# Patient Record
Sex: MALE | Race: BLACK OR AFRICAN AMERICAN | NOT HISPANIC OR LATINO | URBAN - METROPOLITAN AREA
[De-identification: names, ages, dates, MRNs, and addresses within clinical notes are randomized per-mention and may not be internally consistent; named-entity substitution may affect disease eponyms.]

---

## 2022-01-17 ENCOUNTER — INPATIENT (INPATIENT)
Facility: HOSPITAL | Age: 52
LOS: 2 days | Discharge: HOME | DRG: 871 | End: 2022-01-20
Attending: STUDENT IN AN ORGANIZED HEALTH CARE EDUCATION/TRAINING PROGRAM
Payer: COMMERCIAL

## 2022-01-17 VITALS
RESPIRATION RATE: 20 BRPM | HEIGHT: 68 IN | HEART RATE: 124 BPM | TEMPERATURE: 99 F | WEIGHT: 160.06 LBS | DIASTOLIC BLOOD PRESSURE: 66 MMHG | OXYGEN SATURATION: 100 % | SYSTOLIC BLOOD PRESSURE: 95 MMHG

## 2022-01-17 DIAGNOSIS — H26.9 UNSPECIFIED CATARACT: Chronic | ICD-10-CM

## 2022-01-17 DIAGNOSIS — R00.0 TACHYCARDIA, UNSPECIFIED: ICD-10-CM

## 2022-01-17 DIAGNOSIS — R07.9 CHEST PAIN, UNSPECIFIED: ICD-10-CM

## 2022-01-17 DIAGNOSIS — C80.1 MALIGNANT (PRIMARY) NEOPLASM, UNSPECIFIED: ICD-10-CM

## 2022-01-17 DIAGNOSIS — J96.01 ACUTE RESPIRATORY FAILURE WITH HYPOXIA: ICD-10-CM

## 2022-01-17 DIAGNOSIS — M79.602 PAIN IN LEFT ARM: ICD-10-CM

## 2022-01-17 DIAGNOSIS — E78.5 HYPERLIPIDEMIA, UNSPECIFIED: ICD-10-CM

## 2022-01-17 DIAGNOSIS — D64.9 ANEMIA, UNSPECIFIED: ICD-10-CM

## 2022-01-17 DIAGNOSIS — Z29.9 ENCOUNTER FOR PROPHYLACTIC MEASURES, UNSPECIFIED: ICD-10-CM

## 2022-01-17 LAB
ALBUMIN SERPL ELPH-MCNC: 2.6 G/DL — LOW (ref 3.4–5)
ALP SERPL-CCNC: 65 U/L — SIGNIFICANT CHANGE UP (ref 40–120)
ALT FLD-CCNC: 22 U/L — SIGNIFICANT CHANGE UP (ref 12–42)
ANION GAP SERPL CALC-SCNC: 7 MMOL/L — LOW (ref 9–16)
APPEARANCE UR: CLEAR — SIGNIFICANT CHANGE UP
APTT BLD: 36.4 SEC — HIGH (ref 27.5–35.5)
AST SERPL-CCNC: 20 U/L — SIGNIFICANT CHANGE UP (ref 15–37)
BASOPHILS # BLD AUTO: 0.06 K/UL — SIGNIFICANT CHANGE UP (ref 0–0.2)
BASOPHILS NFR BLD AUTO: 0.4 % — SIGNIFICANT CHANGE UP (ref 0–2)
BILIRUB SERPL-MCNC: 0.2 MG/DL — SIGNIFICANT CHANGE UP (ref 0.2–1.2)
BILIRUB UR-MCNC: NEGATIVE — SIGNIFICANT CHANGE UP
BUN SERPL-MCNC: 17 MG/DL — SIGNIFICANT CHANGE UP (ref 7–23)
CALCIUM SERPL-MCNC: 8.6 MG/DL — SIGNIFICANT CHANGE UP (ref 8.5–10.5)
CHLORIDE SERPL-SCNC: 104 MMOL/L — SIGNIFICANT CHANGE UP (ref 96–108)
CO2 SERPL-SCNC: 31 MMOL/L — SIGNIFICANT CHANGE UP (ref 22–31)
COLOR SPEC: YELLOW — SIGNIFICANT CHANGE UP
CREAT SERPL-MCNC: 0.79 MG/DL — SIGNIFICANT CHANGE UP (ref 0.5–1.3)
CRP SERPL-MCNC: 19 MG/L — HIGH (ref 0–9)
D DIMER BLD IA.RAPID-MCNC: 392 NG/ML DDU — HIGH
DIFF PNL FLD: NEGATIVE — SIGNIFICANT CHANGE UP
EOSINOPHIL # BLD AUTO: 0.45 K/UL — SIGNIFICANT CHANGE UP (ref 0–0.5)
EOSINOPHIL NFR BLD AUTO: 2.9 % — SIGNIFICANT CHANGE UP (ref 0–6)
GLUCOSE BLDC GLUCOMTR-MCNC: 116 MG/DL — HIGH (ref 70–99)
GLUCOSE SERPL-MCNC: 96 MG/DL — SIGNIFICANT CHANGE UP (ref 70–99)
GLUCOSE UR QL: NEGATIVE — SIGNIFICANT CHANGE UP
HCT VFR BLD CALC: 26.9 % — LOW (ref 39–50)
HGB BLD-MCNC: 8.6 G/DL — LOW (ref 13–17)
HIV 1 & 2 AB SERPL IA.RAPID: SIGNIFICANT CHANGE UP
IMM GRANULOCYTES NFR BLD AUTO: 0.5 % — SIGNIFICANT CHANGE UP (ref 0–1.5)
INR BLD: 1.25 — HIGH (ref 0.88–1.16)
KETONES UR-MCNC: NEGATIVE — SIGNIFICANT CHANGE UP
LACTATE SERPL-SCNC: 0.5 MMOL/L — SIGNIFICANT CHANGE UP (ref 0.4–2)
LACTATE SERPL-SCNC: 1.2 MMOL/L — SIGNIFICANT CHANGE UP (ref 0.4–2)
LEUKOCYTE ESTERASE UR-ACNC: NEGATIVE — SIGNIFICANT CHANGE UP
LYMPHOCYTES # BLD AUTO: 1.31 K/UL — SIGNIFICANT CHANGE UP (ref 1–3.3)
LYMPHOCYTES # BLD AUTO: 8.3 % — LOW (ref 13–44)
MAGNESIUM SERPL-MCNC: 2.4 MG/DL — SIGNIFICANT CHANGE UP (ref 1.6–2.6)
MCHC RBC-ENTMCNC: 29.7 PG — SIGNIFICANT CHANGE UP (ref 27–34)
MCHC RBC-ENTMCNC: 32 GM/DL — SIGNIFICANT CHANGE UP (ref 32–36)
MCV RBC AUTO: 92.8 FL — SIGNIFICANT CHANGE UP (ref 80–100)
MONOCYTES # BLD AUTO: 1.1 K/UL — HIGH (ref 0–0.9)
MONOCYTES NFR BLD AUTO: 7 % — SIGNIFICANT CHANGE UP (ref 2–14)
MRSA PCR RESULT.: NEGATIVE — SIGNIFICANT CHANGE UP
NEUTROPHILS # BLD AUTO: 12.69 K/UL — HIGH (ref 1.8–7.4)
NEUTROPHILS NFR BLD AUTO: 80.9 % — HIGH (ref 43–77)
NITRITE UR-MCNC: NEGATIVE — SIGNIFICANT CHANGE UP
NRBC # BLD: 0 /100 WBCS — SIGNIFICANT CHANGE UP (ref 0–0)
NT-PROBNP SERPL-SCNC: 513 PG/ML — HIGH
PCO2 BLDV: 36 MMHG — LOW (ref 42–55)
PH BLDV: 7.42 — SIGNIFICANT CHANGE UP (ref 7.32–7.43)
PH UR: 6 — SIGNIFICANT CHANGE UP (ref 5–8)
PLATELET # BLD AUTO: 742 K/UL — HIGH (ref 150–400)
PO2 BLDV: 57 MMHG — HIGH (ref 25–45)
POTASSIUM SERPL-MCNC: 4.1 MMOL/L — SIGNIFICANT CHANGE UP (ref 3.5–5.3)
POTASSIUM SERPL-SCNC: 4.1 MMOL/L — SIGNIFICANT CHANGE UP (ref 3.5–5.3)
PROT SERPL-MCNC: 6.7 G/DL — SIGNIFICANT CHANGE UP (ref 6.4–8.2)
PROT UR-MCNC: NEGATIVE MG/DL — SIGNIFICANT CHANGE UP
PROTHROM AB SERPL-ACNC: 14.6 SEC — HIGH (ref 10.6–13.6)
RBC # BLD: 2.9 M/UL — LOW (ref 4.2–5.8)
RBC # FLD: 13 % — SIGNIFICANT CHANGE UP (ref 10.3–14.5)
S AUREUS DNA NOSE QL NAA+PROBE: NEGATIVE — SIGNIFICANT CHANGE UP
SAO2 % BLDV: 89.8 % — HIGH (ref 67–88)
SARS-COV-2 RNA SPEC QL NAA+PROBE: DETECTED
SODIUM SERPL-SCNC: 142 MMOL/L — SIGNIFICANT CHANGE UP (ref 132–145)
SP GR SPEC: <=1.005 — SIGNIFICANT CHANGE UP (ref 1–1.03)
TROPONIN I, HIGH SENSITIVITY RESULT: 5.8 NG/L — SIGNIFICANT CHANGE UP
TROPONIN I, HIGH SENSITIVITY RESULT: 7.5 NG/L — SIGNIFICANT CHANGE UP
TSH SERPL-MCNC: 1.16 UIU/ML — SIGNIFICANT CHANGE UP (ref 0.36–3.74)
UROBILINOGEN FLD QL: 0.2 E.U./DL — SIGNIFICANT CHANGE UP
WBC # BLD: 15.69 K/UL — HIGH (ref 3.8–10.5)
WBC # FLD AUTO: 15.69 K/UL — HIGH (ref 3.8–10.5)

## 2022-01-17 PROCEDURE — 99223 1ST HOSP IP/OBS HIGH 75: CPT | Mod: GC

## 2022-01-17 PROCEDURE — 71045 X-RAY EXAM CHEST 1 VIEW: CPT | Mod: 26

## 2022-01-17 PROCEDURE — 71275 CT ANGIOGRAPHY CHEST: CPT | Mod: 26

## 2022-01-17 PROCEDURE — 93010 ELECTROCARDIOGRAM REPORT: CPT

## 2022-01-17 PROCEDURE — 99223 1ST HOSP IP/OBS HIGH 75: CPT

## 2022-01-17 PROCEDURE — 99291 CRITICAL CARE FIRST HOUR: CPT | Mod: 25

## 2022-01-17 RX ORDER — ENOXAPARIN SODIUM 100 MG/ML
40 INJECTION SUBCUTANEOUS EVERY 24 HOURS
Refills: 0 | Status: DISCONTINUED | OUTPATIENT
Start: 2022-01-17 | End: 2022-01-20

## 2022-01-17 RX ORDER — GABAPENTIN 400 MG/1
300 CAPSULE ORAL DAILY
Refills: 0 | Status: DISCONTINUED | OUTPATIENT
Start: 2022-01-17 | End: 2022-01-17

## 2022-01-17 RX ORDER — REMDESIVIR 5 MG/ML
200 INJECTION INTRAVENOUS EVERY 24 HOURS
Refills: 0 | Status: COMPLETED | OUTPATIENT
Start: 2022-01-17 | End: 2022-01-17

## 2022-01-17 RX ORDER — PIPERACILLIN AND TAZOBACTAM 4; .5 G/20ML; G/20ML
3.38 INJECTION, POWDER, LYOPHILIZED, FOR SOLUTION INTRAVENOUS ONCE
Refills: 0 | Status: COMPLETED | OUTPATIENT
Start: 2022-01-17 | End: 2022-01-17

## 2022-01-17 RX ORDER — REMDESIVIR 5 MG/ML
100 INJECTION INTRAVENOUS EVERY 24 HOURS
Refills: 0 | Status: DISCONTINUED | OUTPATIENT
Start: 2022-01-18 | End: 2022-01-20

## 2022-01-17 RX ORDER — GABAPENTIN 400 MG/1
400 CAPSULE ORAL EVERY 8 HOURS
Refills: 0 | Status: DISCONTINUED | OUTPATIENT
Start: 2022-01-17 | End: 2022-01-19

## 2022-01-17 RX ORDER — IPRATROPIUM/ALBUTEROL SULFATE 18-103MCG
3 AEROSOL WITH ADAPTER (GRAM) INHALATION
Refills: 0 | Status: COMPLETED | OUTPATIENT
Start: 2022-01-17 | End: 2022-01-17

## 2022-01-17 RX ORDER — DEXAMETHASONE 0.5 MG/5ML
6 ELIXIR ORAL EVERY 24 HOURS
Refills: 0 | Status: DISCONTINUED | OUTPATIENT
Start: 2022-01-18 | End: 2022-01-20

## 2022-01-17 RX ORDER — REMDESIVIR 5 MG/ML
INJECTION INTRAVENOUS
Refills: 0 | Status: DISCONTINUED | OUTPATIENT
Start: 2022-01-17 | End: 2022-01-20

## 2022-01-17 RX ORDER — AZITHROMYCIN 500 MG/1
500 TABLET, FILM COATED ORAL ONCE
Refills: 0 | Status: DISCONTINUED | OUTPATIENT
Start: 2022-01-17 | End: 2022-01-17

## 2022-01-17 RX ORDER — PIPERACILLIN AND TAZOBACTAM 4; .5 G/20ML; G/20ML
4.5 INJECTION, POWDER, LYOPHILIZED, FOR SOLUTION INTRAVENOUS EVERY 6 HOURS
Refills: 0 | Status: DISCONTINUED | OUTPATIENT
Start: 2022-01-17 | End: 2022-01-20

## 2022-01-17 RX ORDER — SODIUM CHLORIDE 9 MG/ML
2300 INJECTION INTRAMUSCULAR; INTRAVENOUS; SUBCUTANEOUS ONCE
Refills: 0 | Status: COMPLETED | OUTPATIENT
Start: 2022-01-17 | End: 2022-01-17

## 2022-01-17 RX ORDER — ACETAMINOPHEN 500 MG
975 TABLET ORAL ONCE
Refills: 0 | Status: COMPLETED | OUTPATIENT
Start: 2022-01-17 | End: 2022-01-17

## 2022-01-17 RX ORDER — PANTOPRAZOLE SODIUM 20 MG/1
40 TABLET, DELAYED RELEASE ORAL
Refills: 0 | Status: DISCONTINUED | OUTPATIENT
Start: 2022-01-17 | End: 2022-01-20

## 2022-01-17 RX ORDER — SODIUM CHLORIDE 9 MG/ML
500 INJECTION INTRAMUSCULAR; INTRAVENOUS; SUBCUTANEOUS ONCE
Refills: 0 | Status: COMPLETED | OUTPATIENT
Start: 2022-01-17 | End: 2022-01-17

## 2022-01-17 RX ORDER — CEFTRIAXONE 500 MG/1
1000 INJECTION, POWDER, FOR SOLUTION INTRAMUSCULAR; INTRAVENOUS ONCE
Refills: 0 | Status: COMPLETED | OUTPATIENT
Start: 2022-01-17 | End: 2022-01-17

## 2022-01-17 RX ORDER — MORPHINE SULFATE 50 MG/1
2 CAPSULE, EXTENDED RELEASE ORAL EVERY 4 HOURS
Refills: 0 | Status: DISCONTINUED | OUTPATIENT
Start: 2022-01-17 | End: 2022-01-19

## 2022-01-17 RX ORDER — IBUPROFEN 200 MG
400 TABLET ORAL ONCE
Refills: 0 | Status: COMPLETED | OUTPATIENT
Start: 2022-01-17 | End: 2022-01-17

## 2022-01-17 RX ORDER — SODIUM CHLORIDE 9 MG/ML
1000 INJECTION INTRAMUSCULAR; INTRAVENOUS; SUBCUTANEOUS ONCE
Refills: 0 | Status: COMPLETED | OUTPATIENT
Start: 2022-01-17 | End: 2022-01-17

## 2022-01-17 RX ORDER — PIPERACILLIN AND TAZOBACTAM 4; .5 G/20ML; G/20ML
3.38 INJECTION, POWDER, LYOPHILIZED, FOR SOLUTION INTRAVENOUS EVERY 8 HOURS
Refills: 0 | Status: DISCONTINUED | OUTPATIENT
Start: 2022-01-17 | End: 2022-01-17

## 2022-01-17 RX ORDER — ATORVASTATIN CALCIUM 80 MG/1
20 TABLET, FILM COATED ORAL AT BEDTIME
Refills: 0 | Status: DISCONTINUED | OUTPATIENT
Start: 2022-01-17 | End: 2022-01-20

## 2022-01-17 RX ORDER — DEXAMETHASONE 0.5 MG/5ML
10 ELIXIR ORAL ONCE
Refills: 0 | Status: COMPLETED | OUTPATIENT
Start: 2022-01-17 | End: 2022-01-17

## 2022-01-17 RX ADMIN — Medication 3 MILLILITER(S): at 02:15

## 2022-01-17 RX ADMIN — SODIUM CHLORIDE 2300 MILLILITER(S): 9 INJECTION INTRAMUSCULAR; INTRAVENOUS; SUBCUTANEOUS at 01:07

## 2022-01-17 RX ADMIN — Medication 400 MILLIGRAM(S): at 23:30

## 2022-01-17 RX ADMIN — SODIUM CHLORIDE 2300 MILLILITER(S): 9 INJECTION INTRAMUSCULAR; INTRAVENOUS; SUBCUTANEOUS at 03:46

## 2022-01-17 RX ADMIN — PIPERACILLIN AND TAZOBACTAM 200 GRAM(S): 4; .5 INJECTION, POWDER, LYOPHILIZED, FOR SOLUTION INTRAVENOUS at 17:46

## 2022-01-17 RX ADMIN — Medication 3 MILLILITER(S): at 02:30

## 2022-01-17 RX ADMIN — Medication 10 MILLIGRAM(S): at 01:07

## 2022-01-17 RX ADMIN — Medication 3 MILLILITER(S): at 02:45

## 2022-01-17 RX ADMIN — SODIUM CHLORIDE 1000 MILLILITER(S): 9 INJECTION INTRAMUSCULAR; INTRAVENOUS; SUBCUTANEOUS at 04:46

## 2022-01-17 RX ADMIN — PIPERACILLIN AND TAZOBACTAM 200 GRAM(S): 4; .5 INJECTION, POWDER, LYOPHILIZED, FOR SOLUTION INTRAVENOUS at 05:53

## 2022-01-17 RX ADMIN — CEFTRIAXONE 100 MILLIGRAM(S): 500 INJECTION, POWDER, FOR SOLUTION INTRAMUSCULAR; INTRAVENOUS at 01:07

## 2022-01-17 RX ADMIN — Medication 400 MILLIGRAM(S): at 22:18

## 2022-01-17 RX ADMIN — REMDESIVIR 500 MILLIGRAM(S): 5 INJECTION INTRAVENOUS at 17:46

## 2022-01-17 RX ADMIN — GABAPENTIN 300 MILLIGRAM(S): 400 CAPSULE ORAL at 19:16

## 2022-01-17 RX ADMIN — ENOXAPARIN SODIUM 40 MILLIGRAM(S): 100 INJECTION SUBCUTANEOUS at 22:18

## 2022-01-17 RX ADMIN — SODIUM CHLORIDE 1000 MILLILITER(S): 9 INJECTION INTRAMUSCULAR; INTRAVENOUS; SUBCUTANEOUS at 23:11

## 2022-01-17 RX ADMIN — Medication 975 MILLIGRAM(S): at 01:10

## 2022-01-17 RX ADMIN — ATORVASTATIN CALCIUM 20 MILLIGRAM(S): 80 TABLET, FILM COATED ORAL at 23:11

## 2022-01-17 NOTE — H&P ADULT - NSHPPHYSICALEXAM_GEN_ALL_CORE
Constitutional: Laying comfortably and breathing via 3 L NC  HEENT: NC/AT, PERRL, non icteric sclera  Neck: supple, no JVD  Respiratory: CTA b/l  Cardiovascular: S1 S2 heard, no murmur  Gastrointestinal: soft, NT/ND, BSx4  Genitourinary: no CVA or supra pubic tenderness   Vascular: pulses palpable b/l  Neurological: AAO x3, weak left hand    Musculoskeletal: left hand thenar and hypothenar wasting, proximal and distal interphalangeal joints flexed, thumb ROM preserved, extremely tender left arm

## 2022-01-17 NOTE — PROGRESS NOTE ADULT - PROBLEM SELECTOR PLAN 6
Pt was diagnosed with lung malignancy/ metastasis but according to him, no work up was done, no treatment was given   CT chest PE: Innumerable bilateral solid pulmonary lung nodules consistent with metastatic disease.    To do:  - try to get collaterals from [facility]  - consider heme onc consult tomorrow Pt was diagnosed with lung malignancy/ metastasis but according to him, no work up was done, no treatment was given   CT chest PE: Innumerable bilateral solid pulmonary lung nodules consistent with metastatic disease.    To do:  - try to get collaterals from [facility]  - consider heme onc consult tomorrow  -Nutrition consult (order in)

## 2022-01-17 NOTE — H&P ADULT - PROBLEM SELECTOR PLAN 5
F: none   E: replete prn   N: DASH/TLC  A: Lovenox 40 mg daily   GI: Pantoprazole 40mg  D: Telemetry---> RMF

## 2022-01-17 NOTE — CHART NOTE - NSCHARTNOTEFT_GEN_A_CORE
Pt refused labs upon arriving to [facility]. Given that patient presented with L sided chest pain with EKG findings of Qwaves in V1-V2 (unknown baseline), attempted to repeat lab work including troponin and CKMB. Explained to patient the importance of lab work given his symptoms, to r/o an acute MI, however he states that we all "get a kick out of taking labs" and that is all we do. Pt refusing labs at this time after knowing and understanding risks and benefits. Will continue to encourage lab draws, EKGs.

## 2022-01-17 NOTE — PROGRESS NOTE ADULT - PROBLEM SELECTOR PLAN 2
Pt c/o chest pain radiating to neck and left arm and Left hands   - On adm , RR 20 sat 100% on RA--> 95% on 3L, Trop I 7.5-->5.8  - EKG: sinus tachycardia  - chest and right arm is tender to touch   - CT chest PE: Innumerable bilateral solid pulmonary lung nodules consistent with metastatic disease.  

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
## 2022-01-17 NOTE — PROGRESS NOTE ADULT - PROBLEM SELECTOR PLAN 7
F: none   E: replete prn   N: DASH/TLC  A: Lovenox 40 mg daily   GI: Pantoprazole 40mg  D: [facility] Home med atorvastatin 20mg  -Continue

## 2022-01-17 NOTE — PROGRESS NOTE ADULT - PROBLEM SELECTOR PLAN 4
On adm Hb 8.6, MCV 92.8 likely AOCD    To do:   - Monitor H/H   - active type and screen  - transfuse prn

## 2022-01-17 NOTE — H&P ADULT - PROBLEM SELECTOR PLAN 4
F: none   E: replete prn   N: DASH/TLC  A: Lovenox 40 mg daily   GI: Pantoprazole 40mg  D: Telemetry---> RMF Pt was diagnosed with lung malignancy/ metastasis but according to him, no work up was done, no treatment was given   CT chest PE: Innumerable bilateral solid pulmonary lung nodules consistent with metastatic disease.    To do:  - try to get collaterals from [facility]  - consider heme onc consult

## 2022-01-17 NOTE — PROGRESS NOTE ADULT - PROBLEM SELECTOR PLAN 3
Patient persisstently tachycardic with sinus rhythm on EKG. Patient reports SOB requiring 6 L NC and complaining of L arm and hand pain., most likely causing the tachycardia.   - CT PE negative fo PE  - continue to monitor

## 2022-01-17 NOTE — H&P ADULT - ATTENDING COMMENTS
Patient seen and examined with house-staff during bedside rounds.  Resident note read, including vitals, physical findings, laboratory data, and radiological reports.   Revisions included below.  Direct personal management at bed side and extensive interpretation of the data.  Plan was outlined and discussed in details with the housestaff.  Decision making of high complexity  Action taken for acute disease activity to reflect the level of care provided:  - medication reconciliation  - review laboratory data  Patient was admitted with left chest pain.  The CT scan of the chest was negative for thromboembolic disease.  Patient has multiple nodular lesions consistent metastatic disease primary as per patient lung but also could be kidney or renal.  The right lower lobe infiltrate I doubt is related to his underlying malignancy.  This is a could be related to COVID-pneumonia and or superimposed bacterial pneumonia as the patient has been recently in the hospital and also lives in the shelter.  The saturation is stable on 2 L nasal cannula.  To start remdesivir Decadron and Zosyn.  MRSA was negative.  Patient is stable to transfer to the floor

## 2022-01-17 NOTE — H&P ADULT - PROBLEM SELECTOR PLAN 2
Pt c/o chest pain radiating to neck and left arm   - On adm , RR 20 sat 100% on RA--> 95% on 3L, Trop I 7.5-->5.8  - EKG: sinus tachycardia  - chest and right arm is tender to touch   - CT chest PE: Innumerable bilateral solid pulmonary lung nodules consistent with metastatic disease.  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

## 2022-01-17 NOTE — PROGRESS NOTE ADULT - PROBLEM SELECTOR PLAN 1
On admission , RR 20 sat 100% on RA--> 95% on 3L. EKG: sinus tachycardia. CT PE: no pulmonary embolism, Patchy ground-glass and consolidative opacities bilaterally consistent with bilateral interstitial pneumonia secondary to patient's Covid 19 diagnosis but may also consider post-obstructive pna. MRSA negative   - f/u procalcitonin level, COVID labs: patient refusing labs, will continue to encourage morning labs   - c/w Remdesevir x5 and Decadron x10 total doses for COVID pna  - c/w Zosyn for HAP (given he was at [facility] for malignancy work up) which can be discontinued after procal result is available On admission , RR 20 sat 100% on RA--> 95% on 3L. EKG: sinus tachycardia. CT PE: no pulmonary embolism, Patchy ground-glass and consolidative opacities bilaterally consistent with bilateral interstitial pneumonia secondary to patient's Covid 19 diagnosis but may also consider post-obstructive pna or CAP. MRSA negative   - f/u procalcitonin level, COVID labs: patient refusing labs, will continue to encourage morning labs   - c/w Remdesivir x5 and Decadron x10 total doses for COVID pna  - c/w Zosyn for HAP (given he was at [facility] for malignancy work up) which can be discontinued after procal result is available  -On 6l. Wean as tolerated

## 2022-01-17 NOTE — PROGRESS NOTE ADULT - PROBLEM SELECTOR PLAN 5
On adm Hb 8.6, MCV 92.8 likely AOCD    To do:   - Monitor H/H   - active type and screen  - transfuse prn On adm Hb 8.6, MCV 92.8 likely AOCD  -F/u iron studies, B12, Folate  - Monitor H/H   - active type and screen  - transfuse prn

## 2022-01-17 NOTE — PROGRESS NOTE ADULT - PROBLEM SELECTOR PLAN 2
Pt c/o chest pain radiating to neck and left arm and Left hands   - On adm , RR 20 sat 100% on RA--> 95% on 3L, Trop I 7.5-->5.8  - EKG: sinus tachycardia  - chest and right arm is tender to touch   - CT chest PE: Innumerable bilateral solid pulmonary lung nodules consistent with metastatic disease.  

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
## 2022-01-17 NOTE — ED ADULT TRIAGE NOTE - PRO INTERPRETER NEED 2
The patient's mother is calling to ask if the doctor recommends the patient to have the Hep A and Manjacocal  vaccines.   English

## 2022-01-17 NOTE — ED PROVIDER NOTE - CRITICAL CARE ATTENDING CONTRIBUTION TO CARE
The patient was seen immediately upon arrival due to a high probability of imminent or life-threatening deterioration secondary to COVID, metastatic cancer, sepsis, which required my direct attention, intervention, and personal management at the bedside. I have personally provided critical care time exclusive of time spent on separately billable procedures. Time includes review of laboratory data, radiology results, discussion with consultants, discussion with the patient's family, and monitoring for potential decompensation.

## 2022-01-17 NOTE — PROGRESS NOTE ADULT - PROBLEM SELECTOR PLAN 4
Patient persisstently tachycardic with sinus rhythm on EKG. Patient reports SOB requiring 6 L NC and complaining of L arm and hand pain., most likely causing the tachycardia.   - CT PE negative fo PE  - continue to monitor Patient persistently tachycardic with sinus rhythm on EKG. Patient reports SOB requiring 6 L NC and complaining of L arm and hand pain. Home HTN meds include metoprolol succ 50mg and Entresto 24/26 BID  -Hold home meds i/s/o normotension  - CT PE negative fo PE  - continue to monitor    #HTN  Home HTN meds include metoprolol succ 50mg and Entresto 24/26 BID. Cr wnl  -Restart home Entresto Patient persistently tachycardic with sinus rhythm on EKG. Patient reports SOB requiring 6 L NC and complaining of L arm and hand pain. Home HTN meds include metoprolol succ 50mg and Entresto 24/26 BID  -Hold home meds i/s/o normotension  - CT PE negative fo PE  - continue to monitor  - f/u TSH, T4  -500CC bolus of NS    #HTN  Home HTN meds include metoprolol succ 50mg and Entresto 24/26 BID. Cr wnl  -Hold home meds i/s/o low BPs

## 2022-01-17 NOTE — PROGRESS NOTE ADULT - PROBLEM SELECTOR PLAN 8
F: none   E: replete prn   N: DASH/TLC  A: Lovenox 40 mg daily   GI: Pantoprazole 40mg  D: RMF F: none   E: replete prn   N: DASH/TLC with iss (on steriod)  A: Lovenox 40 mg daily   GI: Pantoprazole 40mg  D: RMF

## 2022-01-17 NOTE — H&P ADULT - PROBLEM SELECTOR PLAN 3
Pt was diagnosed with lung malignancy/ metastasis but according to him, no work up was done, no treatment was given   CT chest PE: Innumerable bilateral solid pulmonary lung nodules consistent with metastatic disease.    To do:  - try to get collaterals from [facility]  - consider heme onc consult On adm Hb 8.6, MCV 92.8 likely AOCD    To do:   - active type and screen  - transfuse prn

## 2022-01-17 NOTE — H&P ADULT - PROBLEM SELECTOR PLAN 1
- On adm , RR 20 sat 100% on RA--> 95% on 3L  - EKG: sinus tachycardia  -  CT PE: no pulmonary embolism, Patchy ground-glass and consolidative opacities bilaterally consistent with bilateral interstitial pneumonia secondary to patient's Covid 19 diagnosis.  - MRSA negative     To do:   - f/u procalcitonin level, COVID labs   - cw Remdesevir x5 and Decadron x10 total doses for COVID pna  - cw Zosyn for HAP (given he was at [facility] for malignancy work up) which can be discontinued after procal result is available

## 2022-01-17 NOTE — PROGRESS NOTE ADULT - PROBLEM SELECTOR PLAN 1
- On adm , RR 20 sat 100% on RA--> 95% on 3L  - EKG: sinus tachycardia  -  CT PE: no pulmonary embolism, Patchy ground-glass and consolidative opacities bilaterally consistent with bilateral interstitial pneumonia secondary to patient's Covid 19 diagnosis.  - MRSA negative     To do:   - f/u procalcitonin level, COVID labs: patient refusing labs, will continue to encourage morning labs   - c/w Remdesevir x5 and Decadron x10 total doses for COVID pna  - c/w Zosyn for HAP (given he was at [facility] for malignancy work up) which can be discontinued after procal result is available

## 2022-01-17 NOTE — ED PROVIDER NOTE - PROGRESS NOTE DETAILS
Lungs better after nebs, Sats 100% on RA, RR 25-28, HR still in 120's despited 3.3L NS. BP soft at 94/59. Case discussed with accepted for SDU by Dr. Gallegos. Patient is agreeable. Appears to have liver lesions as well as b/l lung nodules.
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
## 2022-01-17 NOTE — PROGRESS NOTE ADULT - PROBLEM SELECTOR PLAN 5
Pt was diagnosed with lung malignancy/ metastasis but according to him, no work up was done, no treatment was given   CT chest PE: Innumerable bilateral solid pulmonary lung nodules consistent with metastatic disease.    To do:  - try to get collaterals from [facility]  - consider heme onc consult tomorrow
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
## 2022-01-17 NOTE — PROGRESS NOTE ADULT - PROBLEM SELECTOR PLAN 3
Takes gabapentin 400mg TID however states this does not alleviate his pain. Pain is electrical and burning in nature with limited ROM of hand, and limited strength.  -Neurology consult in AM  - Increase gabapentin 300 TID to home dose 400 TID  - f/u PT and OT recs Takes gabapentin 400mg TID however states this does not alleviate his pain. Pain is electrical and burning in nature with limited ROM of hand, and limited strength. Nerve compression related to malignancy  -Neurology consult in AM  - Increase gabapentin 300 TID to home dose 400 TID  - f/u PT and OT recs  -ESR CRP

## 2022-01-17 NOTE — H&P ADULT - NSHPLABSRESULTS_GEN_ALL_CORE
.  LABS:                         8.6    15.69 )-----------( 742      ( 17 Jan 2022 01:24 )             26.9     01-17    142  |  104  |  17  ----------------------------<  96  4.1   |  31  |  0.79    Ca    8.6      17 Jan 2022 01:24  Mg     2.4     01-17    TPro  6.7  /  Alb  2.6<L>  /  TBili  0.2  /  DBili  x   /  AST  20  /  ALT  22  /  AlkPhos  65  01-17    PT/INR - ( 17 Jan 2022 01:24 )   PT: 14.6 sec;   INR: 1.25          PTT - ( 17 Jan 2022 01:24 )  PTT:36.4 sec  Urinalysis Basic - ( 17 Jan 2022 04:37 )    Color: Yellow / Appearance: Clear / SG: <=1.005 / pH: x  Gluc: x / Ketone: NEGATIVE  / Bili: NEGATIVE / Urobili: 0.2 E.U./dL   Blood: x / Protein: NEGATIVE mg/dL / Nitrite: NEGATIVE   Leuk Esterase: NEGATIVE / RBC: x / WBC x   Sq Epi: x / Non Sq Epi: x / Bacteria: x            Lactate, Blood: 0.5 mmoL/L (01-17 @ 05:45)      RADIOLOGY, EKG & ADDITIONAL TESTS: Reviewed.
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
## 2022-01-17 NOTE — ED PROVIDER NOTE - CLINICAL SUMMARY MEDICAL DECISION MAKING FREE TEXT BOX
Patient presenting with  and HARIKA, + hx of COVID and recent dx pf lung CA (? primary or metastatic). patient is a very poor historian. Sepsis celestin initiated as he felt warm. Trial of nebs/decadron. Will need admission.

## 2022-01-17 NOTE — ED PROVIDER NOTE - PHYSICAL EXAMINATION
VITAL SIGNS: I have reviewed nursing notes and confirm.  CONSTITUTIONAL: Frail, thin, tachypneic, no resp distress, fair hygiene, pleasant and cooperative  SKIN: Skin is warm and dry, no acute rash.  HEAD: Normocephalic; atraumatic.  EYES: Pupils round bilaterally, 3mm; conjunctiva and sclera clear.  ENT: No nasal discharge; airway clear, MMM.  NECK: Supple; non tender.  CARD: S1, S2 normal; no murmurs, gallops, or rubs. Regular rate and rhythm.  RESP: Scattered expiratory wheezes, bibasilar crackles.  : No CVAT tenderness bilaterally   ABD: Soft; non-distended; non-tender; no rebound or guarding.  EXT: Normal ROM. No clubbing, cyanosis or edema.  NEURO: Alert, oriented. Grossly unremarkable. FRANCIS, normal tone, no gross motor or sensory changes. Fluent speech.   PSYCH: Cooperative, appropriate. Mood and affect wnl.

## 2022-01-17 NOTE — PATIENT PROFILE ADULT - FALL HARM RISK - HARM RISK INTERVENTIONS

## 2022-01-18 LAB
CULTURE RESULTS: NO GROWTH — SIGNIFICANT CHANGE UP
GLUCOSE BLDC GLUCOMTR-MCNC: 101 MG/DL — HIGH (ref 70–99)
GLUCOSE BLDC GLUCOMTR-MCNC: 107 MG/DL — HIGH (ref 70–99)
GLUCOSE BLDC GLUCOMTR-MCNC: 122 MG/DL — HIGH (ref 70–99)
GLUCOSE BLDC GLUCOMTR-MCNC: 125 MG/DL — HIGH (ref 70–99)
SPECIMEN SOURCE: SIGNIFICANT CHANGE UP

## 2022-01-18 PROCEDURE — 99233 SBSQ HOSP IP/OBS HIGH 50: CPT

## 2022-01-18 RX ORDER — OXYCODONE HYDROCHLORIDE 5 MG/1
5 TABLET ORAL EVERY 4 HOURS
Refills: 0 | Status: DISCONTINUED | OUTPATIENT
Start: 2022-01-18 | End: 2022-01-19

## 2022-01-18 RX ORDER — GLUCAGON INJECTION, SOLUTION 0.5 MG/.1ML
1 INJECTION, SOLUTION SUBCUTANEOUS ONCE
Refills: 0 | Status: DISCONTINUED | OUTPATIENT
Start: 2022-01-18 | End: 2022-01-20

## 2022-01-18 RX ORDER — DEXTROSE 50 % IN WATER 50 %
25 SYRINGE (ML) INTRAVENOUS ONCE
Refills: 0 | Status: DISCONTINUED | OUTPATIENT
Start: 2022-01-18 | End: 2022-01-20

## 2022-01-18 RX ORDER — SODIUM CHLORIDE 9 MG/ML
1000 INJECTION, SOLUTION INTRAVENOUS
Refills: 0 | Status: DISCONTINUED | OUTPATIENT
Start: 2022-01-18 | End: 2022-01-20

## 2022-01-18 RX ORDER — IBUPROFEN 200 MG
400 TABLET ORAL EVERY 8 HOURS
Refills: 0 | Status: DISCONTINUED | OUTPATIENT
Start: 2022-01-18 | End: 2022-01-20

## 2022-01-18 RX ORDER — INSULIN LISPRO 100/ML
VIAL (ML) SUBCUTANEOUS
Refills: 0 | Status: DISCONTINUED | OUTPATIENT
Start: 2022-01-18 | End: 2022-01-20

## 2022-01-18 RX ORDER — METOPROLOL TARTRATE 50 MG
1 TABLET ORAL
Qty: 0 | Refills: 0 | DISCHARGE

## 2022-01-18 RX ORDER — DEXTROSE 50 % IN WATER 50 %
12.5 SYRINGE (ML) INTRAVENOUS ONCE
Refills: 0 | Status: DISCONTINUED | OUTPATIENT
Start: 2022-01-18 | End: 2022-01-20

## 2022-01-18 RX ORDER — DEXTROSE 50 % IN WATER 50 %
15 SYRINGE (ML) INTRAVENOUS ONCE
Refills: 0 | Status: DISCONTINUED | OUTPATIENT
Start: 2022-01-18 | End: 2022-01-20

## 2022-01-18 RX ADMIN — MORPHINE SULFATE 2 MILLIGRAM(S): 50 CAPSULE, EXTENDED RELEASE ORAL at 21:16

## 2022-01-18 RX ADMIN — PIPERACILLIN AND TAZOBACTAM 200 GRAM(S): 4; .5 INJECTION, POWDER, LYOPHILIZED, FOR SOLUTION INTRAVENOUS at 13:07

## 2022-01-18 RX ADMIN — GABAPENTIN 400 MILLIGRAM(S): 400 CAPSULE ORAL at 06:45

## 2022-01-18 RX ADMIN — PIPERACILLIN AND TAZOBACTAM 200 GRAM(S): 4; .5 INJECTION, POWDER, LYOPHILIZED, FOR SOLUTION INTRAVENOUS at 06:46

## 2022-01-18 RX ADMIN — OXYCODONE HYDROCHLORIDE 5 MILLIGRAM(S): 5 TABLET ORAL at 12:46

## 2022-01-18 RX ADMIN — GABAPENTIN 400 MILLIGRAM(S): 400 CAPSULE ORAL at 21:16

## 2022-01-18 RX ADMIN — REMDESIVIR 500 MILLIGRAM(S): 5 INJECTION INTRAVENOUS at 16:03

## 2022-01-18 RX ADMIN — PANTOPRAZOLE SODIUM 40 MILLIGRAM(S): 20 TABLET, DELAYED RELEASE ORAL at 06:45

## 2022-01-18 RX ADMIN — PIPERACILLIN AND TAZOBACTAM 200 GRAM(S): 4; .5 INJECTION, POWDER, LYOPHILIZED, FOR SOLUTION INTRAVENOUS at 01:04

## 2022-01-18 RX ADMIN — MORPHINE SULFATE 2 MILLIGRAM(S): 50 CAPSULE, EXTENDED RELEASE ORAL at 21:56

## 2022-01-18 RX ADMIN — ATORVASTATIN CALCIUM 20 MILLIGRAM(S): 80 TABLET, FILM COATED ORAL at 21:16

## 2022-01-18 RX ADMIN — PIPERACILLIN AND TAZOBACTAM 100 GRAM(S): 4; .5 INJECTION, POWDER, LYOPHILIZED, FOR SOLUTION INTRAVENOUS at 18:00

## 2022-01-18 RX ADMIN — GABAPENTIN 400 MILLIGRAM(S): 400 CAPSULE ORAL at 13:04

## 2022-01-18 RX ADMIN — Medication 6 MILLIGRAM(S): at 06:45

## 2022-01-18 RX ADMIN — OXYCODONE HYDROCHLORIDE 5 MILLIGRAM(S): 5 TABLET ORAL at 11:46

## 2022-01-18 NOTE — DIETITIAN INITIAL EVALUATION ADULT. - PROBLEM SELECTOR PLAN 4
Pt was diagnosed with lung malignancy/ metastasis but according to him, no work up was done, no treatment was given   CT chest PE: Innumerable bilateral solid pulmonary lung nodules consistent with metastatic disease.    To do:  - try to get collaterals from [facility]  - consider heme onc consult

## 2022-01-18 NOTE — DIETITIAN INITIAL EVALUATION ADULT. - PROBLEM SELECTOR PLAN 5
Yes, Non-Core measure site...
F: none   E: replete prn   N: DASH/TLC  A: Lovenox 40 mg daily   GI: Pantoprazole 40mg  D: Telemetry---> RMF

## 2022-01-18 NOTE — OCCUPATIONAL THERAPY INITIAL EVALUATION ADULT - PLANNED THERAPY INTERVENTIONS, OT EVAL
ADL retraining/balance training/bed mobility training/joint mobilization/ROM/strengthening/transfer training

## 2022-01-18 NOTE — DISCHARGE NOTE PROVIDER - CARE PROVIDERS DIRECT ADDRESSES
[email or web address],DirectAddress_Unknown ,DirectAddress_Unknown,DirectAddress_Unknown
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
## 2022-01-18 NOTE — PROGRESS NOTE ADULT - PROBLEM SELECTOR PLAN 1
On admission , RR 20 sat 100% on RA--> 95% on 3L. EKG: sinus tachycardia. CT PE: no pulmonary embolism, Patchy ground-glass and consolidative opacities bilaterally consistent with bilateral interstitial pneumonia secondary to patient's Covid 19 diagnosis but may also consider post-obstructive pna or CAP. MRSA negative   - f/u procalcitonin level, COVID labs: patient refusing labs, will continue to encourage morning labs   - c/w Remdesivir x5 and Decadron x10 total doses for COVID pna  - c/w Zosyn for HAP (given he was at [facility] for malignancy work up) which can be discontinued after procal result is available  -On 6l. Wean as tolerated

## 2022-01-18 NOTE — OCCUPATIONAL THERAPY INITIAL EVALUATION ADULT - GENERAL OBSERVATIONS, REHAB EVAL
Patient A&Ox4, agreeable and tolerated session fairly. Patient received semisupine in bed +CONTACT, +6 L O2 via NC, heplock IV, NAD.

## 2022-01-18 NOTE — DIETITIAN INITIAL EVALUATION ADULT. - PROBLEM SELECTOR PLAN 1
- On adm , RR 20 sat 100% on RA--> 95% on 3L  - EKG: sinus tachycardia  -  CT PE: no pulmonary embolism, Patchy ground-glass and consolidative opacities bilaterally consistent with bilateral interstitial pneumonia secondary to patient's Covid 19 diagnosis.  - MRSA negative     To do:   - f/u procalcitonin level, COVID labs   - cw Remdesevir x5 and Decadron x10 total doses for COVID pna  - cw Zosyn for HAP (given he was at [facility] for malignancy work up) which can be discontinued after procal result is available

## 2022-01-18 NOTE — DIETITIAN INITIAL EVALUATION ADULT. - ORAL INTAKE PTA/DIET HISTORY
Patient resides in shelter,Claims they provide food, but he does not eat what is provided .Purchases own food .Claims he has high cholesterol(and on meds PTA) but eats cheese, eggs and other high cholesterol foods .Avoids red meat. Dislikes milk and chocolate.
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
## 2022-01-18 NOTE — OCCUPATIONAL THERAPY INITIAL EVALUATION ADULT - MANUAL MUSCLE TESTING RESULTS, REHAB EVAL
RUE/BLE at least 3+/5 based on functional mobility assessment against gravity, LUE shoulder/elbow flexion 3/5, wrist flexion/extension 3-/5, digits 2-5 2/5./no strength deficits were identified

## 2022-01-18 NOTE — OCCUPATIONAL THERAPY INITIAL EVALUATION ADULT - ADDITIONAL COMMENTS
Patient reports living at a shelter and was independent with all ADL's, and functional mobility with no AD indoors and SC in the community. Patient is R hand dominant. Patient noted with decreased strength/ROM of LUE for a year.

## 2022-01-18 NOTE — OCCUPATIONAL THERAPY INITIAL EVALUATION ADULT - PRECAUTIONS/LIMITATIONS, REHAB EVAL
6 L O2 via NC, patient on room air during functional mobility- sating between 90-94%/fall precautions/oxygen therapy device and L/min

## 2022-01-18 NOTE — PROGRESS NOTE ADULT - PROBLEM SELECTOR PLAN 2
Pt c/o chest pain radiating to neck and left arm and Left hands   - On adm , RR 20 sat 100% on RA--> 95% on 3L, Trop I 7.5-->5.8  - EKG: sinus tachycardia  - chest and right arm is tender to touch   - CT chest PE: Innumerable bilateral solid pulmonary lung nodules consistent with metastatic disease.  - likely 2/2 neuropathy due to malignancy    Plan  - started morphine 2mg prn for severe pain and prn oxycodone 5mg for mod pain  - f/u TTE
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
## 2022-01-18 NOTE — PROGRESS NOTE ADULT - PROBLEM SELECTOR PLAN 8
F: none   E: replete prn   N: DASH/TLC with iss (on steriod)  A: Lovenox 40 mg daily   GI: Pantoprazole 40mg  D: RMF

## 2022-01-18 NOTE — DISCHARGE NOTE PROVIDER - PROVIDER TOKENS
PROVIDER:[TOKEN:[[id number]]] PROVIDER:[TOKEN:[[id number]]],FREE:[LAST:[Luz],FIRST:[Nico],PHONE:[phone]],FAX:[(   )    -],SCHEDULEDAPPT:[01/25/2022],SCHEDULEDAPPTTIME:[12:30 PM],ESTABLISHEDPATIENT:[T]] FREE:[LAST:[Luz],FIRST:[Nico],PHONE:[phone]],FAX:[(   )    -],SCHEDULEDAPPT:[01/25/2022],SCHEDULEDAPPTTIME:[12:30 PM],ESTABLISHEDPATIENT:[T]],FREE:[LAST:[Alireza],FIRST:[Julian],PHONE:[phone]],FAX:[(   )    -],ADDRESS:[[address] [place] [address]],SCHEDULEDAPPT:[02/02/2022],SCHEDULEDAPPTTIME:[10:00 AM]]
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
## 2022-01-18 NOTE — PROGRESS NOTE ADULT - PROBLEM SELECTOR PLAN 3
Takes gabapentin 400mg TID however states this does not alleviate his pain. Pain is electrical and burning in nature with limited ROM of hand, and limited strength. Nerve compression related to malignancy  - f/u Neurology recs  - c/w home dose gabapentin 400 TID  - f/u PT and OT recs

## 2022-01-18 NOTE — DISCHARGE NOTE PROVIDER - NSDCCPCAREPLAN_GEN_ALL_CORE_FT
PRINCIPAL DISCHARGE DIAGNOSIS  Diagnosis: Pneumonia due to COVID-19 virus  Assessment and Plan of Treatment:       SECONDARY DISCHARGE DIAGNOSES  Diagnosis: Metastatic cancer  Assessment and Plan of Treatment:     Diagnosis: Volkmann contracture  Assessment and Plan of Treatment: Contracture of the left hand    Diagnosis: Pneumonia due to COVID-19 virus  Assessment and Plan of Treatment:     Diagnosis: Secondary bacterial pneumonia  Assessment and Plan of Treatment:      PRINCIPAL DISCHARGE DIAGNOSIS  Diagnosis: Pneumonia due to COVID-19 virus  Assessment and Plan of Treatment: You came in with shortness of breath and chest pain. You were found to be covid+. The shortness of breath and chest pain were due to covid pneumonia. You required oxygen when you came in and we were able to come down to 4L of oxygen when walking. We set up home oxygen for you while you have pnuemonia from covid. You will need 4L of oxygen while you were sick. You were also given remdesivir for 4 days and decadron. You were also given antibiotics to treat the pneumonia as well. You will continue to take the decadron at home to complete the course. You will also be given antibiotics to complete the course. Please followup with your PCP regarding your pneumonia as well as Dr. Escobar oncologist regarding the cancer in your lung.      SECONDARY DISCHARGE DIAGNOSES  Diagnosis: Metastatic cancer  Assessment and Plan of Treatment: You were diagnosed with cancer that is in your lung at [facility]. Please make sure to make your appointment with Dr. Escobar regarding diagnosing and treating your cancer on 1/25.    Diagnosis: Volkmann contracture  Assessment and Plan of Treatment: You have a contracture of your hand that causes you a lot of pain. We increased your gabapentin to 600mg 4 times a day. We also got you an appointment with Dr. Lay in [place] on 2/2 at 10AM for hand surgery evaluation.     PRINCIPAL DISCHARGE DIAGNOSIS  Diagnosis: Pneumonia due to COVID-19 virus  Assessment and Plan of Treatment: You came in with shortness of breath and chest pain. You were found to be covid+. The shortness of breath and chest pain were due to covid pneumonia. You required oxygen when you came in and we were able to come down to 4L of oxygen when walking. We set up home oxygen for you while you have pnuemonia from covid. You will need 4L of oxygen while you were sick. You were also given remdesivir for 4 days and decadron. You were also given IV antibiotics to treat the pneumonia as well. You will continue to take the decadron at home to complete the course. You will also be given antibiotics doxycycline to take by mouth to complete the course. Please followup with your PCP regarding your pneumonia as well as Dr. Escobar oncologist regarding the cancer in your lung.      SECONDARY DISCHARGE DIAGNOSES  Diagnosis: Metastatic cancer  Assessment and Plan of Treatment: You were diagnosed with cancer that is in your lung at [facility]. Please make sure to make your appointment with Dr. Escobar regarding diagnosing and treating your cancer on 1/25.    Diagnosis: Volkmann contracture  Assessment and Plan of Treatment: You have a contracture of your hand that causes you a lot of pain. We increased your gabapentin to 600mg 4 times a day. We also got you an appointment with Dr. Lay in [place] on 2/2 at 10AM for hand surgery evaluation.    Diagnosis: Sinus tachycardia  Assessment and Plan of Treatment: You were found to have a high heart rate while in the hospital. This may be due to being very thin. You should restart your metoprolol XL 50mg once a day

## 2022-01-18 NOTE — DISCHARGE NOTE PROVIDER - CARE PROVIDER_API CALL
KAYLEY JOHNS  Surgery  N  ANDREAS ZALDIVAR, Phys,    Phone: ()-  Fax: ()-  Follow Up Time:    KAYLEY JOHNS  Surgery  N  ANDREAS ZALDIVAR, Phys,    Phone: ()-  Fax: ()-  Follow Up Time:     Nico Escobar  Phone: [phone]  Fax: (   )    -  Established Patient  Scheduled Appointment: 01/25/2022 12:30 PM   Nico Escobar  Phone: [phone]  Fax: (   )    -  Established Patient  Scheduled Appointment: 01/25/2022 12:30 PM    Julian Lay  [address] [place] [address]  Phone: [phone]  Fax: (   )    -  Scheduled Appointment: 02/02/2022 10:00 AM

## 2022-01-18 NOTE — OCCUPATIONAL THERAPY INITIAL EVALUATION ADULT - MODIFIED CLINICAL TEST OF SENSORY INTEGRATION IN BALANCE TEST
Patient functionally ambulated 10 feet with supervision and no AD- deferred further functional mobility 2/2 LUE pain and aerobic fatigue- patient sating from 90-94% on room air.

## 2022-01-18 NOTE — PROGRESS NOTE ADULT - SUBJECTIVE AND OBJECTIVE BOX
CC: Patient is a 51y old  Male who presents with a chief complaint of chest pain (18 Jan 2022 09:23)      INTERVAL EVENTS: INGRID    SUBJECTIVE / INTERVAL HPI: Patient seen and examined at bedside. Pt complains of hand pain. Told him he has pain meds ordered he just needs to ask for them. also told him we will give him a regimen for pain standing. Also says that he has not had followup with oncology regarding his cancer.     ROS: negative unless otherwise stated above.    VITAL SIGNS:  Vital Signs Last 24 Hrs  T(C): 36.3 (18 Jan 2022 06:03), Max: 36.8 (17 Jan 2022 20:19)  T(F): 97.4 (18 Jan 2022 06:03), Max: 98.3 (17 Jan 2022 20:19)  HR: 111 (18 Jan 2022 06:03) (110 - 120)  BP: 94/65 (18 Jan 2022 06:03) (94/65 - 97/60)  BP(mean): 70 (17 Jan 2022 16:05) (70 - 75)  RR: 21 (18 Jan 2022 06:03) (20 - 21)  SpO2: 95% (18 Jan 2022 06:03) (95% - 98%)      01-17-22 @ 07:01  -  01-18-22 @ 07:00  --------------------------------------------------------  IN: 470 mL / OUT: 1300 mL / NET: -830 mL        PHYSICAL EXAM:    General: NAD  HEENT: MMM  Neck: supple  Cardiovascular: +S1/S2; RRR  Respiratory: rhonchi throughout  Gastrointestinal: soft, NT/ND  Extremities: WWP; contracted left hand with inability to extend DIP and PIP joints.   Vascular: 2+ radial, DP/PT pulses B/L  Neurological: AAOx3; no focal deficits    MEDICATIONS:  MEDICATIONS  (STANDING):  atorvastatin 20 milliGRAM(s) Oral at bedtime  dexAMETHasone  Injectable 6 milliGRAM(s) IV Push every 24 hours  dextrose 40% Gel 15 Gram(s) Oral once  dextrose 5%. 1000 milliLiter(s) (50 mL/Hr) IV Continuous <Continuous>  dextrose 5%. 1000 milliLiter(s) (100 mL/Hr) IV Continuous <Continuous>  dextrose 50% Injectable 25 Gram(s) IV Push once  dextrose 50% Injectable 12.5 Gram(s) IV Push once  dextrose 50% Injectable 25 Gram(s) IV Push once  enoxaparin Injectable 40 milliGRAM(s) SubCutaneous every 24 hours  gabapentin 400 milliGRAM(s) Oral every 8 hours  glucagon  Injectable 1 milliGRAM(s) IntraMuscular once  insulin lispro (ADMELOG) corrective regimen sliding scale   SubCutaneous Before meals and at bedtime  pantoprazole    Tablet 40 milliGRAM(s) Oral before breakfast  piperacillin/tazobactam IVPB.. 4.5 Gram(s) IV Intermittent every 6 hours  remdesivir  IVPB 100 milliGRAM(s) IV Intermittent every 24 hours  remdesivir  IVPB   IV Intermittent     MEDICATIONS  (PRN):  morphine  - Injectable 2 milliGRAM(s) IV Push every 4 hours PRN Severe Pain (7 - 10)  oxyCODONE    IR 5 milliGRAM(s) Oral every 4 hours PRN Moderate Pain (4 - 6)      ALLERGIES:  Allergies    No Known Allergies    Intolerances        LABS:                        8.6    15.69 )-----------( 742      ( 17 Jan 2022 01:24 )             26.9     01-17    142  |  104  |  17  ----------------------------<  96  4.1   |  31  |  0.79    Ca    8.6      17 Jan 2022 01:24  Mg     2.4     01-17    TPro  6.7  /  Alb  2.6<L>  /  TBili  0.2  /  DBili  x   /  AST  20  /  ALT  22  /  AlkPhos  65  01-17    PT/INR - ( 17 Jan 2022 01:24 )   PT: 14.6 sec;   INR: 1.25          PTT - ( 17 Jan 2022 01:24 )  PTT:36.4 sec  Urinalysis Basic - ( 17 Jan 2022 04:37 )    Color: Yellow / Appearance: Clear / SG: <=1.005 / pH: x  Gluc: x / Ketone: NEGATIVE  / Bili: NEGATIVE / Urobili: 0.2 E.U./dL   Blood: x / Protein: NEGATIVE mg/dL / Nitrite: NEGATIVE   Leuk Esterase: NEGATIVE / RBC: x / WBC x   Sq Epi: x / Non Sq Epi: x / Bacteria: x      CAPILLARY BLOOD GLUCOSE      POCT Blood Glucose.: 101 mg/dL (18 Jan 2022 08:22)      RADIOLOGY & ADDITIONAL TESTS: Reviewed.

## 2022-01-18 NOTE — PROGRESS NOTE ADULT - PROBLEM SELECTOR PLAN 5
On adm Hb 8.6, MCV 92.8 likely AOCD  -F/u iron studies, B12, Folate  - Monitor H/H   - active type and screen  - transfuse prn

## 2022-01-18 NOTE — DIETITIAN INITIAL EVALUATION ADULT. - NS FNS WEIGHT CHANGE REASON
patient unable to quantify weight history.Noted 2016 weight was 122lbs.Patient stated UBW 122lbs>Time frame of recent weight loss unknown/unintentional

## 2022-01-18 NOTE — OCCUPATIONAL THERAPY INITIAL EVALUATION ADULT - DIAGNOSIS, OT EVAL
Patient presents with LUE pain- from shoulder to digits 1-5, hypersensitivity to thenar/palmar eminence to digits 2-5, decreased wrist and digits 1-5 ROM (digits 2-5 in flexed position), deficits with activity tolerance and breathing requiring 6 L O2 via nc at this time, impacting independence with functional activities and mobility.

## 2022-01-18 NOTE — PROGRESS NOTE ADULT - ATTENDING COMMENTS
remains on 6L NC this AM, wean as tolerated  TTE ordered  oncology consulted for newly diagnosed lung cancer - pt reports no recent follow up

## 2022-01-18 NOTE — PROGRESS NOTE ADULT - PROBLEM SELECTOR PLAN 4
Patient persistently tachycardic with sinus rhythm on EKG. Patient reports SOB requiring 6 L NC and complaining of L arm and hand pain. Home HTN meds include metoprolol succ 50mg and Entresto 24/26 BID  -Hold home meds i/s/o normotension  - CT PE negative fo PE  - continue to monitor  - tsh normal  - s/p 500CC bolus of NS    #HTN  Home HTN meds include metoprolol succ 50mg and Entresto 24/26 BID. Cr wnl  -Hold home meds i/s/o low BPs

## 2022-01-18 NOTE — DISCHARGE NOTE PROVIDER - NSDCMRMEDTOKEN_GEN_ALL_CORE_FT
atorvastatin 20 mg oral tablet: 1 tab(s) orally once a day (at bedtime)  Ecotrin Adult Low Strength 81 mg oral delayed release tablet: 1 tab(s) orally once a day  Entresto 24 mg-26 mg oral tablet: 1 tab(s) orally 2 times a day  gabapentin 400 mg oral capsule: 1 cap(s) orally 3 times a day  Outpatient Physical Therapy:   Toprol-XL 50 mg oral tablet, extended release: 1 tab(s) orally once a day   atorvastatin 20 mg oral tablet: 1 tab(s) orally once a day (at bedtime)  dexamethasone 6 mg oral tablet: 1 tab(s) orally once a day until 1/24  doxycycline monohydrate 100 mg oral tablet: 1 tab(s) orally 2 times a day until 1/24  Ecotrin Adult Low Strength 81 mg oral delayed release tablet: 1 tab(s) orally once a day  guaifenesin-dextromethorphan 100 mg-10 mg/5 mL oral liquid: 20 milliliter(s) orally every 6 hours, As needed, Cough  Neurontin 600 mg oral tablet: 1 tab(s) orally every 8 hours, As needed, Moderate pain  Outpatient Physical Therapy:   Toprol-XL 50 mg oral tablet, extended release: 1 tab(s) orally once a day

## 2022-01-18 NOTE — PROGRESS NOTE ADULT - PROBLEM SELECTOR PLAN 6
Pt was diagnosed with lung malignancy/ metastasis but according to him, no work up was done, no treatment was given   CT chest PE: Innumerable bilateral solid pulmonary lung nodules consistent with metastatic disease.    To do:  - try to get collaterals from [facility]  - pt originally said he didn't have any workup or followup at [facility] but heme/onc consult said that he told them that he has care established and is going for staging outpatient on 1/25 but doesn't know the doctors name

## 2022-01-18 NOTE — PHYSICAL THERAPY INITIAL EVALUATION ADULT - RANGE OF MOTION EXAMINATION, REHAB EVAL
decreased left hand wrist flexion and extension,  and ulnar deviation/bilateral upper extremity ROM was WFL (within functional limits)/bilateral lower extremity ROM was WFL (within functional limits)/deficits as listed below

## 2022-01-18 NOTE — DISCHARGE NOTE PROVIDER - DETAILS OF MALNUTRITION DIAGNOSIS/DIAGNOSES
This patient has been assessed with a concern for Malnutrition and was treated during this hospitalization for the following Nutrition diagnosis/diagnoses:     -  01/18/2022: Moderate protein-calorie malnutrition   -  01/18/2022: Underweight (BMI < 19)

## 2022-01-18 NOTE — PHYSICAL THERAPY INITIAL EVALUATION ADULT - IMPAIRMENTS FOUND, PT EVAL
aerobic capacity/endurance/gait, locomotion, and balance/gross motor/joint integrity and mobility/muscle strength/poor safety awareness/posture/ROM

## 2022-01-18 NOTE — CHART NOTE - NSCHARTNOTEFT_GEN_A_CORE
Pt is refusing blood draws. Wants it drawn off of the IV. Asked nurse manager on the floor who said that is against policy.

## 2022-01-18 NOTE — DISCHARGE NOTE PROVIDER - NSDCFUADDAPPT_GEN_ALL_CORE_FT
Please followup with your schedule appointment with the oncologist at [facility] that your have scheduled on 1/25 Please followup with your schedule appointment with the oncologist at [facility] that your have scheduled on 1/25    Please schedule appointment with Marcos Moreau hand surgery [phone] in 2 weeks for hand evaluation for contractures Please followup with your schedule appointment with the oncologist at [facility] that your have scheduled on 1/25 with Dr. Nico Escobar at 1230PM    Please schedule appointment with Marcos Moreau hand surgery [phone] in 2 weeks for hand evaluation for contractures Please followup with your schedule appointment with the oncologist at [facility] that your have scheduled on 1/25 with Dr. Nico Escobar at 1230PM    Please followup with Dr. Alireza Jordan Hand Surgeon at [address] [place] [address] on 2/2/22 at 10AM

## 2022-01-18 NOTE — DISCHARGE NOTE PROVIDER - INSTRUCTIONS
Please make sure to supplement your diet with ensure if possible due to you being underweight currently

## 2022-01-18 NOTE — OCCUPATIONAL THERAPY INITIAL EVALUATION ADULT - LIGHT TOUCH SENSATION, LUE, REHAB EVAL
Hypersensitivity and to thenar/small eminence, digits 2-5, decreased sensation from L shoulder to palm./severe impairment
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
## 2022-01-19 VITALS
OXYGEN SATURATION: 96 % | SYSTOLIC BLOOD PRESSURE: 92 MMHG | DIASTOLIC BLOOD PRESSURE: 56 MMHG | RESPIRATION RATE: 18 BRPM | TEMPERATURE: 98 F | HEART RATE: 108 BPM

## 2022-01-19 LAB
A1C WITH ESTIMATED AVERAGE GLUCOSE RESULT: 4.8 % — SIGNIFICANT CHANGE UP (ref 4–5.6)
ALBUMIN SERPL ELPH-MCNC: 3 G/DL — LOW (ref 3.3–5)
ALP SERPL-CCNC: 58 U/L — SIGNIFICANT CHANGE UP (ref 40–120)
ALT FLD-CCNC: 13 U/L — SIGNIFICANT CHANGE UP (ref 10–45)
ANION GAP SERPL CALC-SCNC: 10 MMOL/L — SIGNIFICANT CHANGE UP (ref 5–17)
AST SERPL-CCNC: 13 U/L — SIGNIFICANT CHANGE UP (ref 10–40)
BASOPHILS # BLD AUTO: 0.04 K/UL — SIGNIFICANT CHANGE UP (ref 0–0.2)
BASOPHILS NFR BLD AUTO: 0.2 % — SIGNIFICANT CHANGE UP (ref 0–2)
BILIRUB SERPL-MCNC: 0.4 MG/DL — SIGNIFICANT CHANGE UP (ref 0.2–1.2)
BUN SERPL-MCNC: 22 MG/DL — SIGNIFICANT CHANGE UP (ref 7–23)
CALCIUM SERPL-MCNC: 8.8 MG/DL — SIGNIFICANT CHANGE UP (ref 8.4–10.5)
CHLORIDE SERPL-SCNC: 99 MMOL/L — SIGNIFICANT CHANGE UP (ref 96–108)
CO2 SERPL-SCNC: 26 MMOL/L — SIGNIFICANT CHANGE UP (ref 22–31)
CREAT SERPL-MCNC: 1.01 MG/DL — SIGNIFICANT CHANGE UP (ref 0.5–1.3)
EOSINOPHIL # BLD AUTO: 0.03 K/UL — SIGNIFICANT CHANGE UP (ref 0–0.5)
EOSINOPHIL NFR BLD AUTO: 0.2 % — SIGNIFICANT CHANGE UP (ref 0–6)
ESTIMATED AVERAGE GLUCOSE: 91 MG/DL — SIGNIFICANT CHANGE UP (ref 68–114)
GLUCOSE BLDC GLUCOMTR-MCNC: 112 MG/DL — HIGH (ref 70–99)
GLUCOSE BLDC GLUCOMTR-MCNC: 131 MG/DL — HIGH (ref 70–99)
GLUCOSE SERPL-MCNC: 111 MG/DL — HIGH (ref 70–99)
HCT VFR BLD CALC: 28.3 % — LOW (ref 39–50)
HGB BLD-MCNC: 8.8 G/DL — LOW (ref 13–17)
IMM GRANULOCYTES NFR BLD AUTO: 0.6 % — SIGNIFICANT CHANGE UP (ref 0–1.5)
LYMPHOCYTES # BLD AUTO: 0.62 K/UL — LOW (ref 1–3.3)
LYMPHOCYTES # BLD AUTO: 3.4 % — LOW (ref 13–44)
MAGNESIUM SERPL-MCNC: 2 MG/DL — SIGNIFICANT CHANGE UP (ref 1.6–2.6)
MCHC RBC-ENTMCNC: 29.1 PG — SIGNIFICANT CHANGE UP (ref 27–34)
MCHC RBC-ENTMCNC: 31.1 GM/DL — LOW (ref 32–36)
MCV RBC AUTO: 93.7 FL — SIGNIFICANT CHANGE UP (ref 80–100)
MONOCYTES # BLD AUTO: 0.96 K/UL — HIGH (ref 0–0.9)
MONOCYTES NFR BLD AUTO: 5.3 % — SIGNIFICANT CHANGE UP (ref 2–14)
NEUTROPHILS # BLD AUTO: 16.33 K/UL — HIGH (ref 1.8–7.4)
NEUTROPHILS NFR BLD AUTO: 90.3 % — HIGH (ref 43–77)
NRBC # BLD: 0 /100 WBCS — SIGNIFICANT CHANGE UP (ref 0–0)
PHOSPHATE SERPL-MCNC: 4.4 MG/DL — SIGNIFICANT CHANGE UP (ref 2.5–4.5)
PLATELET # BLD AUTO: 665 K/UL — HIGH (ref 150–400)
POTASSIUM SERPL-MCNC: 4.2 MMOL/L — SIGNIFICANT CHANGE UP (ref 3.5–5.3)
POTASSIUM SERPL-SCNC: 4.2 MMOL/L — SIGNIFICANT CHANGE UP (ref 3.5–5.3)
PROT SERPL-MCNC: 6 G/DL — SIGNIFICANT CHANGE UP (ref 6–8.3)
RBC # BLD: 3.02 M/UL — LOW (ref 4.2–5.8)
RBC # FLD: 13.2 % — SIGNIFICANT CHANGE UP (ref 10.3–14.5)
SODIUM SERPL-SCNC: 135 MMOL/L — SIGNIFICANT CHANGE UP (ref 135–145)
WBC # BLD: 18.08 K/UL — HIGH (ref 3.8–10.5)
WBC # FLD AUTO: 18.08 K/UL — HIGH (ref 3.8–10.5)

## 2022-01-19 PROCEDURE — 93308 TTE F-UP OR LMTD: CPT | Mod: 26

## 2022-01-19 RX ORDER — GABAPENTIN 400 MG/1
600 CAPSULE ORAL EVERY 6 HOURS
Refills: 0 | Status: DISCONTINUED | OUTPATIENT
Start: 2022-01-19 | End: 2022-01-20

## 2022-01-19 RX ORDER — GUAIFENESIN/DEXTROMETHORPHAN 600MG-30MG
20 TABLET, EXTENDED RELEASE 12 HR ORAL EVERY 6 HOURS
Refills: 0 | Status: DISCONTINUED | OUTPATIENT
Start: 2022-01-19 | End: 2022-01-20

## 2022-01-19 RX ORDER — GABAPENTIN 400 MG/1
600 CAPSULE ORAL EVERY 8 HOURS
Refills: 0 | Status: DISCONTINUED | OUTPATIENT
Start: 2022-01-19 | End: 2022-01-20

## 2022-01-19 RX ADMIN — OXYCODONE HYDROCHLORIDE 5 MILLIGRAM(S): 5 TABLET ORAL at 02:53

## 2022-01-19 RX ADMIN — Medication 6 MILLIGRAM(S): at 06:03

## 2022-01-19 RX ADMIN — GABAPENTIN 600 MILLIGRAM(S): 400 CAPSULE ORAL at 21:45

## 2022-01-19 RX ADMIN — PIPERACILLIN AND TAZOBACTAM 100 GRAM(S): 4; .5 INJECTION, POWDER, LYOPHILIZED, FOR SOLUTION INTRAVENOUS at 07:07

## 2022-01-19 RX ADMIN — OXYCODONE HYDROCHLORIDE 5 MILLIGRAM(S): 5 TABLET ORAL at 03:53

## 2022-01-19 RX ADMIN — GABAPENTIN 400 MILLIGRAM(S): 400 CAPSULE ORAL at 06:04

## 2022-01-19 RX ADMIN — ATORVASTATIN CALCIUM 20 MILLIGRAM(S): 80 TABLET, FILM COATED ORAL at 21:25

## 2022-01-19 RX ADMIN — Medication 400 MILLIGRAM(S): at 22:24

## 2022-01-19 RX ADMIN — Medication 400 MILLIGRAM(S): at 21:24

## 2022-01-19 RX ADMIN — PANTOPRAZOLE SODIUM 40 MILLIGRAM(S): 20 TABLET, DELAYED RELEASE ORAL at 07:16

## 2022-01-19 RX ADMIN — PIPERACILLIN AND TAZOBACTAM 100 GRAM(S): 4; .5 INJECTION, POWDER, LYOPHILIZED, FOR SOLUTION INTRAVENOUS at 02:41

## 2022-01-19 NOTE — PROGRESS NOTE ADULT - SUBJECTIVE AND OBJECTIVE BOX
Neurology Follow up note    Subjective/ Objective:  Patient sleeping this morning, still complaining of Left hand pain and numbness.  Received morphine and oxycodone overnight for pain.  Remains on O2 6L by NC      Vital Signs Last 24 Hrs  T(C): 37.1 (19 Jan 2022 06:11), Max: 37.1 (19 Jan 2022 06:11)  T(F): 98.7 (19 Jan 2022 06:11), Max: 98.7 (19 Jan 2022 06:11)  HR: 110 (19 Jan 2022 06:11) (102 - 110)  BP: 98/60 (19 Jan 2022 06:11) (94/61 - 98/60)  BP(mean): --  RR: 17 (19 Jan 2022 06:11) (17 - 18)  SpO2: 95% (19 Jan 2022 06:11) (95% - 98%)      Neurological Exam:   General:  Appearance is consistent with chronologic age.  No abnormal facies.  Gross skin survey within normal limits.  On O2 by NC  Cognitive/Language:  Awake, alert, and oriented to person, place, time and date. Nondysarthric.    Cranial Nerves  - Eyes: Visual acuity intact, Visual fields full.  EOMI w/o nystagmus, skew or reported double vision.  PERRL.  No ptosis/weakness of eyelid closure.    - Face:  Facial sensation normal V1 - 3, no facial asymmetry.    - Ears/Nose/Throat:  Hearing grossly intact b/l to finger rub.  Palate elevates midline.  Tongue and uvula midline.   Motor examination:  Upper Extremities: R 5/5; Lower extremities: , R 5/5.  No observable drift. LLE 5/5, LUE : No drift, L hand: marked thenar and hypothenar atrophy  flexed PIP/ and DIP joints, mild MCP hyperextention, Very limited exam due to pain and refusal to cooperate.  Sensory examination: Reduced sensation over the L hand , Ulnar >Radial aspect  Reflexes: +2 Reflexes in Upper Extremities bilaterally  Cerebellum:   FTN intact over the right. Not assessed over the L as patient was uncooperative      Medications  atorvastatin 20 milliGRAM(s) Oral at bedtime  dexAMETHasone  Injectable 6 milliGRAM(s) IV Push every 24 hours  dextrose 40% Gel 15 Gram(s) Oral once  dextrose 5%. 1000 milliLiter(s) IV Continuous <Continuous>  dextrose 5%. 1000 milliLiter(s) IV Continuous <Continuous>  dextrose 50% Injectable 25 Gram(s) IV Push once  dextrose 50% Injectable 12.5 Gram(s) IV Push once  dextrose 50% Injectable 25 Gram(s) IV Push once  enoxaparin Injectable 40 milliGRAM(s) SubCutaneous every 24 hours  gabapentin 400 milliGRAM(s) Oral every 8 hours  glucagon  Injectable 1 milliGRAM(s) IntraMuscular once  ibuprofen  Tablet. 400 milliGRAM(s) Oral every 8 hours PRN  insulin lispro (ADMELOG) corrective regimen sliding scale   SubCutaneous Before meals and at bedtime  morphine  - Injectable 2 milliGRAM(s) IV Push every 4 hours PRN  oxyCODONE    IR 5 milliGRAM(s) Oral every 4 hours PRN  pantoprazole    Tablet 40 milliGRAM(s) Oral before breakfast  piperacillin/tazobactam IVPB.. 4.5 Gram(s) IV Intermittent every 6 hours  remdesivir  IVPB 100 milliGRAM(s) IV Intermittent every 24 hours  remdesivir  IVPB   IV Intermittent       Lab                        8.8    18.08 )-----------( 665      ( 19 Jan 2022 08:17 )             28.3     01-19    135  |  99  |  22  ----------------------------<  111<H>  4.2   |  26  |  1.01    Ca    8.8      19 Jan 2022 08:17  Phos  4.4     01-19  Mg     2.0     01-19    TPro  6.0  /  Alb  3.0<L>  /  TBili  0.4  /  DBili  x   /  AST  13  /  ALT  13  /  AlkPhos  58  01-19    CAPILLARY BLOOD GLUCOSE      POCT Blood Glucose.: 107 mg/dL (18 Jan 2022 22:25)  POCT Blood Glucose.: 125 mg/dL (18 Jan 2022 17:18)  POCT Blood Glucose.: 122 mg/dL (18 Jan 2022 12:24)    LIVER FUNCTIONS - ( 19 Jan 2022 08:17 )  Alb: 3.0 g/dL / Pro: 6.0 g/dL / ALK PHOS: 58 U/L / ALT: 13 U/L / AST: 13 U/L / GGT: x

## 2022-01-19 NOTE — PROGRESS NOTE ADULT - PROBLEM SELECTOR PLAN 4
Patient persistently tachycardic with sinus rhythm on EKG. Patient reports SOB requiring 6 L NC and complaining of L arm and hand pain. Home HTN meds include metoprolol succ 50mg and Entresto 24/26 BID  - likely 2/2 malignancy and being cachetic  -Hold home meds i/s/o normotension  - CT PE negative fo PE  - continue to monitor  - tsh normal  - s/p 500CC bolus of NS  - encourage PO intake    #HTN  Home HTN meds include metoprolol succ 50mg and Entresto 24/26 BID. Cr wnl  -Hold home meds i/s/o low BPs

## 2022-01-19 NOTE — PROGRESS NOTE ADULT - PROBLEM SELECTOR PLAN 1
On admission , RR 20 sat 100% on RA--> 95% on 3L. EKG: sinus tachycardia. CT PE: no pulmonary embolism, Patchy ground-glass and consolidative opacities bilaterally consistent with bilateral interstitial pneumonia secondary to patient's Covid 19 diagnosis but may also consider post-obstructive pna or HAP. MRSA negative   - f/u procalcitonin level, COVID labs  - c/w Remdesivir x5 and Decadron x10 total doses for COVID pna  - c/w Zosyn for HAP for 7 day course (given he was at [facility] for malignancy work up) which can be discontinued after procal result is available  -On 6l. Wean as tolerated however haven to been able to wean  - likely will need home o2

## 2022-01-19 NOTE — PROGRESS NOTE ADULT - PROBLEM SELECTOR PLAN 3
Takes gabapentin 400mg TID however states this does not alleviate his pain. Pain is electrical and burning in nature with limited ROM of hand, and limited strength. Possibly 2/2 Nerve compression related to malignancy or just a volkman contracture  - f/u Neurology recs  - increase dose gabapentin 600 q6hr  - PT/OT home with outpt pt/ot  - hand surgery said only outpatient treatment for contracture
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
## 2022-01-19 NOTE — PROGRESS NOTE ADULT - PROBLEM SELECTOR PLAN 2
Pt c/o chest pain radiating to neck and left arm and Left hands   - On adm , RR 20 sat 100% on RA--> 95% on 3L, Trop I 7.5-->5.8  - EKG: sinus tachycardia  - chest and right arm is tender to touch   - CT chest PE: Innumerable bilateral solid pulmonary lung nodules consistent with metastatic disease.  - likely 2/2 neuropathy due to malignancy    Plan  - started ibuprofen and gabapentin for PRN pain meds  - f/u TTE

## 2022-01-19 NOTE — PROGRESS NOTE ADULT - PROBLEM SELECTOR PLAN 6
Pt was diagnosed with lung malignancy/ metastasis but according to him, no work up was done, no treatment was given   CT chest PE: Innumerable bilateral solid pulmonary lung nodules consistent with metastatic disease.    To do:  - pt originally said he didn't have any workup or followup at [facility] but heme/onc consult said that he told them that he has care established and is going for staging outpatient on 1/25 with Dr. Nico Escobar at 1230PM

## 2022-01-19 NOTE — PROGRESS NOTE ADULT - SUBJECTIVE AND OBJECTIVE BOX
CC: Patient is a 51y old  Male who presents with a chief complaint of chest pain (19 Jan 2022 10:17)      INTERVAL EVENTS: INGRID    SUBJECTIVE / INTERVAL HPI: Patient seen and examined at bedside. Pt complains of unchanged pain and nothing is helping him. Also says cough is annoying and without oxygen he is very short of breath. When he walks to the bathroom he feels very short of breath. Pt also says his voice is raspy since his cancer diagnosis in december    ROS: negative unless otherwise stated above.    VITAL SIGNS:  Vital Signs Last 24 Hrs  T(C): 36.9 (19 Jan 2022 12:10), Max: 37.1 (19 Jan 2022 06:11)  T(F): 98.5 (19 Jan 2022 12:10), Max: 98.7 (19 Jan 2022 06:11)  HR: 95 (19 Jan 2022 12:10) (95 - 110)  BP: 88/53 (19 Jan 2022 12:10) (88/53 - 98/60)  BP(mean): --  RR: 18 (19 Jan 2022 12:10) (17 - 18)  SpO2: 97% (19 Jan 2022 12:10) (95% - 97%)        PHYSICAL EXAM:    General: NAD, thin male  HEENT: MMM  Neck: supple  Cardiovascular: +S1/S2; RRR  Respiratory: rhonchi throughout,   Gastrointestinal: soft, NT/ND  Extremities: WWP; left hand contracture  Vascular: 2+ radial, DP/PT pulses B/L  Neurological: AAOx3; no focal deficits    MEDICATIONS:  MEDICATIONS  (STANDING):  atorvastatin 20 milliGRAM(s) Oral at bedtime  dexAMETHasone  Injectable 6 milliGRAM(s) IV Push every 24 hours  dextrose 40% Gel 15 Gram(s) Oral once  dextrose 5%. 1000 milliLiter(s) (50 mL/Hr) IV Continuous <Continuous>  dextrose 5%. 1000 milliLiter(s) (100 mL/Hr) IV Continuous <Continuous>  dextrose 50% Injectable 25 Gram(s) IV Push once  dextrose 50% Injectable 12.5 Gram(s) IV Push once  dextrose 50% Injectable 25 Gram(s) IV Push once  enoxaparin Injectable 40 milliGRAM(s) SubCutaneous every 24 hours  gabapentin 600 milliGRAM(s) Oral every 6 hours  glucagon  Injectable 1 milliGRAM(s) IntraMuscular once  insulin lispro (ADMELOG) corrective regimen sliding scale   SubCutaneous Before meals and at bedtime  pantoprazole    Tablet 40 milliGRAM(s) Oral before breakfast  piperacillin/tazobactam IVPB.. 4.5 Gram(s) IV Intermittent every 6 hours  remdesivir  IVPB 100 milliGRAM(s) IV Intermittent every 24 hours  remdesivir  IVPB   IV Intermittent     MEDICATIONS  (PRN):  gabapentin 600 milliGRAM(s) Oral every 8 hours PRN Moderate pain  guaifenesin/dextromethorphan Oral Liquid 20 milliLiter(s) Oral every 6 hours PRN Cough  ibuprofen  Tablet. 400 milliGRAM(s) Oral every 8 hours PRN Mild Pain (1 - 3)      ALLERGIES:  Allergies    No Known Allergies    Intolerances        LABS:                        8.8    18.08 )-----------( 665      ( 19 Jan 2022 08:17 )             28.3     01-19    135  |  99  |  22  ----------------------------<  111<H>  4.2   |  26  |  1.01    Ca    8.8      19 Jan 2022 08:17  Phos  4.4     01-19  Mg     2.0     01-19    TPro  6.0  /  Alb  3.0<L>  /  TBili  0.4  /  DBili  x   /  AST  13  /  ALT  13  /  AlkPhos  58  01-19        CAPILLARY BLOOD GLUCOSE      POCT Blood Glucose.: 107 mg/dL (18 Jan 2022 22:25)      RADIOLOGY & ADDITIONAL TESTS: Reviewed.

## 2022-01-19 NOTE — PROGRESS NOTE ADULT - NUTRITIONAL ASSESSMENT
This patient has been assessed with a concern for Malnutrition and has been determined to have a diagnosis/diagnoses of Moderate protein-calorie malnutrition and Underweight (BMI < 19).    This patient is being managed with:   Diet DASH/TLC-  Sodium & Cholesterol Restricted  Supplement Feeding Modality:  Oral  Ensure Enlive Cans or Servings Per Day:  1       Frequency:  Three Times a day  Entered: Jan 18 2022 11:24AM    
This patient has been assessed with a concern for Malnutrition and has been determined to have a diagnosis/diagnoses of Moderate protein-calorie malnutrition and Underweight (BMI < 19).    This patient is being managed with:   Diet DASH/TLC-  Sodium & Cholesterol Restricted  Supplement Feeding Modality:  Oral  Ensure Enlive Cans or Servings Per Day:  1       Frequency:  Three Times a day  Entered: Jan 18 2022 11:24AM

## 2022-01-20 LAB — PROCALCITONIN SERPL-MCNC: 0.37 NG/ML — HIGH (ref 0.02–0.1)

## 2022-01-20 PROCEDURE — 84484 ASSAY OF TROPONIN QUANT: CPT

## 2022-01-20 PROCEDURE — 97161 PT EVAL LOW COMPLEX 20 MIN: CPT

## 2022-01-20 PROCEDURE — 93321 DOPPLER ECHO F-UP/LMTD STD: CPT

## 2022-01-20 PROCEDURE — 87635 SARS-COV-2 COVID-19 AMP PRB: CPT

## 2022-01-20 PROCEDURE — 83605 ASSAY OF LACTIC ACID: CPT

## 2022-01-20 PROCEDURE — 96361 HYDRATE IV INFUSION ADD-ON: CPT

## 2022-01-20 PROCEDURE — 84443 ASSAY THYROID STIM HORMONE: CPT

## 2022-01-20 PROCEDURE — 84100 ASSAY OF PHOSPHORUS: CPT

## 2022-01-20 PROCEDURE — 85610 PROTHROMBIN TIME: CPT

## 2022-01-20 PROCEDURE — 87640 STAPH A DNA AMP PROBE: CPT

## 2022-01-20 PROCEDURE — 83880 ASSAY OF NATRIURETIC PEPTIDE: CPT

## 2022-01-20 PROCEDURE — 94640 AIRWAY INHALATION TREATMENT: CPT

## 2022-01-20 PROCEDURE — 87641 MR-STAPH DNA AMP PROBE: CPT

## 2022-01-20 PROCEDURE — 36415 COLL VENOUS BLD VENIPUNCTURE: CPT

## 2022-01-20 PROCEDURE — 71275 CT ANGIOGRAPHY CHEST: CPT

## 2022-01-20 PROCEDURE — 97530 THERAPEUTIC ACTIVITIES: CPT

## 2022-01-20 PROCEDURE — 96375 TX/PRO/DX INJ NEW DRUG ADDON: CPT

## 2022-01-20 PROCEDURE — 82803 BLOOD GASES ANY COMBINATION: CPT

## 2022-01-20 PROCEDURE — 93005 ELECTROCARDIOGRAM TRACING: CPT

## 2022-01-20 PROCEDURE — 80053 COMPREHEN METABOLIC PANEL: CPT

## 2022-01-20 PROCEDURE — 96374 THER/PROPH/DIAG INJ IV PUSH: CPT

## 2022-01-20 PROCEDURE — 71045 X-RAY EXAM CHEST 1 VIEW: CPT

## 2022-01-20 PROCEDURE — 85025 COMPLETE CBC W/AUTO DIFF WBC: CPT

## 2022-01-20 PROCEDURE — 86703 HIV-1/HIV-2 1 RESULT ANTBDY: CPT

## 2022-01-20 PROCEDURE — 81003 URINALYSIS AUTO W/O SCOPE: CPT

## 2022-01-20 PROCEDURE — 99291 CRITICAL CARE FIRST HOUR: CPT | Mod: 25

## 2022-01-20 PROCEDURE — 84145 PROCALCITONIN (PCT): CPT

## 2022-01-20 PROCEDURE — 83735 ASSAY OF MAGNESIUM: CPT

## 2022-01-20 PROCEDURE — 87086 URINE CULTURE/COLONY COUNT: CPT

## 2022-01-20 PROCEDURE — 85379 FIBRIN DEGRADATION QUANT: CPT

## 2022-01-20 PROCEDURE — 83036 HEMOGLOBIN GLYCOSYLATED A1C: CPT

## 2022-01-20 PROCEDURE — 82962 GLUCOSE BLOOD TEST: CPT

## 2022-01-20 PROCEDURE — 85730 THROMBOPLASTIN TIME PARTIAL: CPT

## 2022-01-20 PROCEDURE — 86140 C-REACTIVE PROTEIN: CPT

## 2022-01-20 PROCEDURE — 87040 BLOOD CULTURE FOR BACTERIA: CPT

## 2022-01-20 RX ORDER — SACUBITRIL AND VALSARTAN 24; 26 MG/1; MG/1
1 TABLET, FILM COATED ORAL
Qty: 0 | Refills: 0 | DISCHARGE

## 2022-01-20 RX ORDER — GABAPENTIN 400 MG/1
1 CAPSULE ORAL
Qty: 90 | Refills: 0
Start: 2022-01-20 | End: 2022-02-18

## 2022-01-20 RX ORDER — DEXAMETHASONE 0.5 MG/5ML
1 ELIXIR ORAL
Qty: 4 | Refills: 0
Start: 2022-01-20 | End: 2022-01-23

## 2022-01-20 RX ORDER — GUAIFENESIN/DEXTROMETHORPHAN 600MG-30MG
20 TABLET, EXTENDED RELEASE 12 HR ORAL
Qty: 400 | Refills: 0
Start: 2022-01-20 | End: 2022-01-24

## 2022-01-20 RX ORDER — GABAPENTIN 400 MG/1
1 CAPSULE ORAL
Qty: 0 | Refills: 0 | DISCHARGE

## 2022-01-20 RX ADMIN — PIPERACILLIN AND TAZOBACTAM 100 GRAM(S): 4; .5 INJECTION, POWDER, LYOPHILIZED, FOR SOLUTION INTRAVENOUS at 00:26

## 2022-01-20 NOTE — DISCHARGE NOTE NURSING/CASE MANAGEMENT/SOCIAL WORK - FLU SEASON?
[Fever] : no fever [Chills] : no chills [Eye Pain] : no eye pain [Red Eyes] : eyes not red [Sore Throat] : no sore throat Yes... [Hoarseness] : no hoarseness [Chest Pain] : no chest pain [Palpitations] : no palpitations [Shortness Of Breath] : no shortness of breath [Wheezing] : no wheezing [Abdominal Pain] : no abdominal pain [Vomiting] : no vomiting [Dysuria] : no dysuria [Incontinence] : incontinence [Arthralgias] : no arthralgias [Joint Pain] : no joint pain [Skin Lesions] : no skin lesions [Skin Wound] : no skin wound [Dizziness] : no dizziness [Fainting] : no fainting [Suicidal] : not suicidal [Sleep Disturbances] : no sleep disturbances

## 2022-01-20 NOTE — PROVIDER CONTACT NOTE (MEDICATION) - SITUATION
Patient refused lovenox medicine, medication education provided.
Patient refused AM vitals, lab draws, and medications- dexamethasone, zosyn, protonix, and gabapentin.

## 2022-01-20 NOTE — DISCHARGE NOTE NURSING/CASE MANAGEMENT/SOCIAL WORK - NSDCPEFALRISK_GEN_ALL_CORE
For information on Fall & Injury Prevention, visit: [email or web address] OR  [email or web address] OR  https://www.cdc.gov/steadi/patient.html

## 2022-01-20 NOTE — DISCHARGE NOTE NURSING/CASE MANAGEMENT/SOCIAL WORK - PATIENT PORTAL LINK FT
You can access the FollowMyHealth Patient Portal offered by [facility] by registering at the following website: [email or web address]. By joining [employer]’s FollowMyHealth portal, you will also be able to view your health information using other applications (apps) compatible with our system.

## 2022-01-20 NOTE — DISCHARGE NOTE NURSING/CASE MANAGEMENT/SOCIAL WORK - NSDCFUADDAPPT_GEN_ALL_CORE_FT
Please followup with your schedule appointment with the oncologist at [facility] that your have scheduled on 1/25 with Dr. Nico Escobar at 1230PM    Please followup with Dr. Alireza Jordan Hand Surgeon at [address] [place] [address] on 2/2/22 at 10AM

## 2022-01-22 LAB
CULTURE RESULTS: SIGNIFICANT CHANGE UP
CULTURE RESULTS: SIGNIFICANT CHANGE UP
SPECIMEN SOURCE: SIGNIFICANT CHANGE UP
SPECIMEN SOURCE: SIGNIFICANT CHANGE UP
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
## 2022-02-02 DIAGNOSIS — F17.210 NICOTINE DEPENDENCE, CIGARETTES, UNCOMPLICATED: ICD-10-CM

## 2022-02-02 DIAGNOSIS — C96.9 MALIGNANT NEOPLASM OF LYMPHOID, HEMATOPOIETIC AND RELATED TISSUE, UNSPECIFIED: ICD-10-CM

## 2022-02-02 DIAGNOSIS — E44.0 MODERATE PROTEIN-CALORIE MALNUTRITION: ICD-10-CM

## 2022-02-02 DIAGNOSIS — G56.92 UNSPECIFIED MONONEUROPATHY OF LEFT UPPER LIMB: ICD-10-CM

## 2022-02-02 DIAGNOSIS — R07.9 CHEST PAIN, UNSPECIFIED: ICD-10-CM

## 2022-02-02 DIAGNOSIS — C34.90 MALIGNANT NEOPLASM OF UNSPECIFIED PART OF UNSPECIFIED BRONCHUS OR LUNG: ICD-10-CM
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
## 2022-02-02 DIAGNOSIS — T79.6XXA TRAUMATIC ISCHEMIA OF MUSCLE, INITIAL ENCOUNTER: ICD-10-CM

## 2022-02-02 DIAGNOSIS — J96.01 ACUTE RESPIRATORY FAILURE WITH HYPOXIA: ICD-10-CM

## 2022-02-02 DIAGNOSIS — J12.82 PNEUMONIA DUE TO CORONAVIRUS DISEASE 2019: ICD-10-CM

## 2022-02-02 DIAGNOSIS — J18.9 PNEUMONIA, UNSPECIFIED ORGANISM: ICD-10-CM

## 2022-02-02 DIAGNOSIS — A41.9 SEPSIS, UNSPECIFIED ORGANISM: ICD-10-CM

## 2022-02-02 DIAGNOSIS — D64.9 ANEMIA, UNSPECIFIED: ICD-10-CM

## 2022-02-02 DIAGNOSIS — F12.99 CANNABIS USE, UNSPECIFIED WITH UNSPECIFIED CANNABIS-INDUCED DISORDER: ICD-10-CM

## 2022-02-02 DIAGNOSIS — R00.0 TACHYCARDIA, UNSPECIFIED: ICD-10-CM

## 2022-02-02 DIAGNOSIS — U07.1 COVID-19: ICD-10-CM

## 2022-02-02 DIAGNOSIS — Z59.01 SHELTERED HOMELESSNESS: ICD-10-CM

## 2022-02-02 SDOH — ECONOMIC STABILITY - HOUSING INSECURITY: SHELTERED HOMELESSNESS: Z59.01
